# Patient Record
Sex: FEMALE | Race: WHITE | NOT HISPANIC OR LATINO | URBAN - METROPOLITAN AREA
[De-identification: names, ages, dates, MRNs, and addresses within clinical notes are randomized per-mention and may not be internally consistent; named-entity substitution may affect disease eponyms.]

---

## 2020-02-07 ENCOUNTER — OUTPATIENT (OUTPATIENT)
Dept: OUTPATIENT SERVICES | Facility: HOSPITAL | Age: 30
LOS: 1 days | Discharge: PSYCHIATRIC OR SUBSTANCE USE FACILITY | End: 2020-02-07
Payer: COMMERCIAL

## 2020-02-07 ENCOUNTER — INPATIENT (INPATIENT)
Facility: HOSPITAL | Age: 30
LOS: 6 days | Discharge: HOME | End: 2020-02-14
Attending: PSYCHIATRY & NEUROLOGY | Admitting: PSYCHIATRY & NEUROLOGY
Payer: COMMERCIAL

## 2020-02-07 VITALS — HEIGHT: 69 IN | WEIGHT: 169.98 LBS | RESPIRATION RATE: 19 BRPM | TEMPERATURE: 98 F

## 2020-02-07 DIAGNOSIS — F33.9 MAJOR DEPRESSIVE DISORDER, RECURRENT, UNSPECIFIED: ICD-10-CM

## 2020-02-07 PROCEDURE — 99221 1ST HOSP IP/OBS SF/LOW 40: CPT

## 2020-02-07 RX ORDER — CLONAZEPAM 1 MG
0.5 TABLET ORAL AT BEDTIME
Refills: 0 | Status: DISCONTINUED | OUTPATIENT
Start: 2020-02-07 | End: 2020-02-14

## 2020-02-07 RX ORDER — BREXPIPRAZOLE 0.25 MG/1
0.5 TABLET ORAL DAILY
Refills: 0 | Status: DISCONTINUED | OUTPATIENT
Start: 2020-02-08 | End: 2020-02-10

## 2020-02-07 RX ORDER — DESVENLAFAXINE 50 MG/1
50 TABLET, EXTENDED RELEASE ORAL DAILY
Refills: 0 | Status: DISCONTINUED | OUTPATIENT
Start: 2020-02-08 | End: 2020-02-11

## 2020-02-07 RX ORDER — TRAZODONE HCL 50 MG
50 TABLET ORAL AT BEDTIME
Refills: 0 | Status: DISCONTINUED | OUTPATIENT
Start: 2020-02-07 | End: 2020-02-08

## 2020-02-07 RX ADMIN — Medication 0.5 MILLIGRAM(S): at 21:09

## 2020-02-07 RX ADMIN — Medication 50 MILLIGRAM(S): at 21:09

## 2020-02-07 NOTE — CHART NOTE - NSCHARTNOTEFT_GEN_A_CORE
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    FAMILY HISTORY:  Family history of depression (mother)  Family history of anxiety (maternal aunts)  Family history of schizophrenia (cousins, uncles)      MEDICATIONS  (STANDING):  clonazePAM  Tablet 0.5 milliGRAM(s) Oral at bedtime    MEDICATIONS  (PRN):  LORazepam     Tablet 1 milliGRAM(s) Oral every 4 hours PRN anxiety/agitation  LORazepam   Injectable 2 milliGRAM(s) IntraMuscular once PRN severe agitation  traZODone 50 milliGRAM(s) Oral at bedtime PRN insomnia      Vital Signs Last 24 Hrs  T(C): 36.4 (07 Feb 2020 21:45), Max: 36.6 (07 Feb 2020 17:58)  T(F): 97.6 (07 Feb 2020 21:45), Max: 97.8 (07 Feb 2020 17:58)  HR: 75  BP: 109/76  RR: 18 (07 Feb 2020 21:45) (18 - 19)  SpO2: --  CAPILLARY BLOOD GLUCOSE      PHYSICAL EXAM:  GENERAL: NAD, well-developed, well-nourished, walking in day room upon arrival  HEAD:  Atraumatic, Normocephalic  EYES: EOMI, PERRLA, conjunctiva and sclera clear  NECK: Supple  CHEST/LUNG: Clear to auscultation bilaterally; No wheeze, crackles, rhonchi  HEART: Regular rate and rhythm; +s1 and +s2, No murmurs, rubs, or gallops  ABDOMEN: Soft, Nontender, Nondistended; normoactive Bowel sounds present  EXTREMITIES:  2+ Peripheral Pulses, No clubbing, or edema  PSYCH: AAOx3, pleasant, cooperative  NEUROLOGY: non-focal  SKIN: No rashes or lesions      LABS: *** TO BE DRAWN ON 2/8/2020***      RADIOLOGY & ADDITIONAL TESTS:    Assessment and Plan:    Pt is a 29y.o female who recently gave birth to her first child (12/25/19-boy) with PMHx significant for Inflammatory Bowel Disease who presents to [facility] from [facility] for further evaluation and management of Recurrent major depressive disorder. She reports having natural birth which required her to have an episotomy which is healing well according to her OB/Gyn provider from her 6 week follow up appt. She endorses minimal vaginal discharge since delivering her child on 12/25/19, which her OB/Gyn is aware of- denies this to be bright red blood, or malodorous. Pt currently denies any medical concern including fever, chills, visual changes (double vision, scotomas, vision loss), HA, SOB, CP, Abdominal pain, n/v, constipation, diarrhea, urinary symptoms (dysuria, hematuria, incontinence, frequency/urgency) or changes in bowel movements. Physical Exam unremarkable.     EKG and labs ordered, please follow-up: EKG, CBC, CMP, HcG, TSH, UA, and ToxScreen.    #) Recurrent major depressive disorder: management as per psychiatry team

## 2020-02-08 LAB
ALBUMIN SERPL ELPH-MCNC: 4.7 G/DL — SIGNIFICANT CHANGE UP (ref 3.3–5)
ALP SERPL-CCNC: 67 U/L — SIGNIFICANT CHANGE UP (ref 40–120)
ALT FLD-CCNC: 14 U/L — SIGNIFICANT CHANGE UP (ref 4–33)
ANION GAP SERPL CALC-SCNC: 16 MMO/L — HIGH (ref 7–14)
APAP SERPL-MCNC: < 15 UG/ML — LOW (ref 15–25)
AST SERPL-CCNC: 13 U/L — SIGNIFICANT CHANGE UP (ref 4–32)
BASOPHILS # BLD AUTO: 0.05 K/UL — SIGNIFICANT CHANGE UP (ref 0–0.2)
BASOPHILS NFR BLD AUTO: 0.8 % — SIGNIFICANT CHANGE UP (ref 0–2)
BILIRUB SERPL-MCNC: 0.7 MG/DL — SIGNIFICANT CHANGE UP (ref 0.2–1.2)
BUN SERPL-MCNC: 10 MG/DL — SIGNIFICANT CHANGE UP (ref 7–23)
CALCIUM SERPL-MCNC: 10 MG/DL — SIGNIFICANT CHANGE UP (ref 8.4–10.5)
CHLORIDE SERPL-SCNC: 102 MMOL/L — SIGNIFICANT CHANGE UP (ref 98–107)
CHOLEST SERPL-MCNC: 201 MG/DL — HIGH (ref 120–199)
CO2 SERPL-SCNC: 26 MMOL/L — SIGNIFICANT CHANGE UP (ref 22–31)
CREAT SERPL-MCNC: 1.04 MG/DL — SIGNIFICANT CHANGE UP (ref 0.5–1.3)
EOSINOPHIL # BLD AUTO: 0.14 K/UL — SIGNIFICANT CHANGE UP (ref 0–0.5)
EOSINOPHIL NFR BLD AUTO: 2.2 % — SIGNIFICANT CHANGE UP (ref 0–6)
ETHANOL BLD-MCNC: < 10 MG/DL — SIGNIFICANT CHANGE UP
GLUCOSE SERPL-MCNC: 100 MG/DL — HIGH (ref 70–99)
HBA1C BLD-MCNC: 5.2 % — SIGNIFICANT CHANGE UP (ref 4–5.6)
HCT VFR BLD CALC: 50.6 % — HIGH (ref 34.5–45)
HDLC SERPL-MCNC: 58 MG/DL — SIGNIFICANT CHANGE UP (ref 45–65)
HGB BLD-MCNC: 16.9 G/DL — HIGH (ref 11.5–15.5)
IMM GRANULOCYTES NFR BLD AUTO: 0.2 % — SIGNIFICANT CHANGE UP (ref 0–1.5)
LIPID PNL WITH DIRECT LDL SERPL: 130 MG/DL — SIGNIFICANT CHANGE UP
LYMPHOCYTES # BLD AUTO: 1.94 K/UL — SIGNIFICANT CHANGE UP (ref 1–3.3)
LYMPHOCYTES # BLD AUTO: 30.3 % — SIGNIFICANT CHANGE UP (ref 13–44)
MCHC RBC-ENTMCNC: 31.9 PG — SIGNIFICANT CHANGE UP (ref 27–34)
MCHC RBC-ENTMCNC: 33.4 % — SIGNIFICANT CHANGE UP (ref 32–36)
MCV RBC AUTO: 95.7 FL — SIGNIFICANT CHANGE UP (ref 80–100)
MONOCYTES # BLD AUTO: 0.45 K/UL — SIGNIFICANT CHANGE UP (ref 0–0.9)
MONOCYTES NFR BLD AUTO: 7 % — SIGNIFICANT CHANGE UP (ref 2–14)
NEUTROPHILS # BLD AUTO: 3.82 K/UL — SIGNIFICANT CHANGE UP (ref 1.8–7.4)
NEUTROPHILS NFR BLD AUTO: 59.5 % — SIGNIFICANT CHANGE UP (ref 43–77)
NRBC # FLD: 0 K/UL — SIGNIFICANT CHANGE UP (ref 0–0)
PLATELET # BLD AUTO: 222 K/UL — SIGNIFICANT CHANGE UP (ref 150–400)
PMV BLD: 11.1 FL — SIGNIFICANT CHANGE UP (ref 7–13)
POTASSIUM SERPL-MCNC: 3.8 MMOL/L — SIGNIFICANT CHANGE UP (ref 3.5–5.3)
POTASSIUM SERPL-SCNC: 3.8 MMOL/L — SIGNIFICANT CHANGE UP (ref 3.5–5.3)
PROT SERPL-MCNC: 7.4 G/DL — SIGNIFICANT CHANGE UP (ref 6–8.3)
RBC # BLD: 5.29 M/UL — HIGH (ref 3.8–5.2)
RBC # FLD: 11.9 % — SIGNIFICANT CHANGE UP (ref 10.3–14.5)
SALICYLATES SERPL-MCNC: < 5 MG/DL — LOW (ref 15–30)
SODIUM SERPL-SCNC: 144 MMOL/L — SIGNIFICANT CHANGE UP (ref 135–145)
TRIGL SERPL-MCNC: 94 MG/DL — SIGNIFICANT CHANGE UP (ref 10–149)
TSH SERPL-MCNC: 1.34 UIU/ML — SIGNIFICANT CHANGE UP (ref 0.27–4.2)
WBC # BLD: 6.41 K/UL — SIGNIFICANT CHANGE UP (ref 3.8–10.5)
WBC # FLD AUTO: 6.41 K/UL — SIGNIFICANT CHANGE UP (ref 3.8–10.5)

## 2020-02-08 PROCEDURE — 99222 1ST HOSP IP/OBS MODERATE 55: CPT

## 2020-02-08 RX ORDER — TRAZODONE HCL 50 MG
100 TABLET ORAL AT BEDTIME
Refills: 0 | Status: DISCONTINUED | OUTPATIENT
Start: 2020-02-08 | End: 2020-02-10

## 2020-02-08 RX ADMIN — Medication 0.5 MILLIGRAM(S): at 20:07

## 2020-02-08 RX ADMIN — Medication 100 MILLIGRAM(S): at 20:08

## 2020-02-08 RX ADMIN — BREXPIPRAZOLE 0.5 MILLIGRAM(S): 0.25 TABLET ORAL at 09:45

## 2020-02-08 RX ADMIN — DESVENLAFAXINE 50 MILLIGRAM(S): 50 TABLET, EXTENDED RELEASE ORAL at 09:45

## 2020-02-09 PROCEDURE — 99231 SBSQ HOSP IP/OBS SF/LOW 25: CPT

## 2020-02-09 PROCEDURE — 93010 ELECTROCARDIOGRAM REPORT: CPT

## 2020-02-09 RX ADMIN — BREXPIPRAZOLE 0.5 MILLIGRAM(S): 0.25 TABLET ORAL at 08:40

## 2020-02-09 RX ADMIN — Medication 100 MILLIGRAM(S): at 20:03

## 2020-02-09 RX ADMIN — DESVENLAFAXINE 50 MILLIGRAM(S): 50 TABLET, EXTENDED RELEASE ORAL at 08:40

## 2020-02-09 RX ADMIN — Medication 0.5 MILLIGRAM(S): at 20:03

## 2020-02-10 PROCEDURE — 99232 SBSQ HOSP IP/OBS MODERATE 35: CPT | Mod: GC

## 2020-02-10 PROCEDURE — 90853 GROUP PSYCHOTHERAPY: CPT

## 2020-02-10 RX ORDER — MIRTAZAPINE 45 MG/1
15 TABLET, ORALLY DISINTEGRATING ORAL AT BEDTIME
Refills: 0 | Status: DISCONTINUED | OUTPATIENT
Start: 2020-02-10 | End: 2020-02-14

## 2020-02-10 RX ADMIN — DESVENLAFAXINE 50 MILLIGRAM(S): 50 TABLET, EXTENDED RELEASE ORAL at 08:28

## 2020-02-10 RX ADMIN — Medication 0.5 MILLIGRAM(S): at 21:24

## 2020-02-10 RX ADMIN — BREXPIPRAZOLE 0.5 MILLIGRAM(S): 0.25 TABLET ORAL at 08:28

## 2020-02-10 RX ADMIN — MIRTAZAPINE 7.5 MILLIGRAM(S): 45 TABLET, ORALLY DISINTEGRATING ORAL at 21:24

## 2020-02-11 PROCEDURE — 90853 GROUP PSYCHOTHERAPY: CPT

## 2020-02-11 PROCEDURE — 99232 SBSQ HOSP IP/OBS MODERATE 35: CPT | Mod: GC

## 2020-02-11 RX ADMIN — Medication 0.5 MILLIGRAM(S): at 20:57

## 2020-02-11 RX ADMIN — MIRTAZAPINE 7.5 MILLIGRAM(S): 45 TABLET, ORALLY DISINTEGRATING ORAL at 20:57

## 2020-02-12 PROCEDURE — 99232 SBSQ HOSP IP/OBS MODERATE 35: CPT | Mod: GC

## 2020-02-12 RX ORDER — HYDROXYZINE HCL 10 MG
25 TABLET ORAL EVERY 6 HOURS
Refills: 0 | Status: DISCONTINUED | OUTPATIENT
Start: 2020-02-12 | End: 2020-02-14

## 2020-02-12 RX ORDER — ACETAMINOPHEN 500 MG
650 TABLET ORAL EVERY 6 HOURS
Refills: 0 | Status: DISCONTINUED | OUTPATIENT
Start: 2020-02-12 | End: 2020-02-14

## 2020-02-12 RX ADMIN — MIRTAZAPINE 15 MILLIGRAM(S): 45 TABLET, ORALLY DISINTEGRATING ORAL at 22:11

## 2020-02-12 RX ADMIN — Medication 0.5 MILLIGRAM(S): at 22:10

## 2020-02-13 PROCEDURE — 99232 SBSQ HOSP IP/OBS MODERATE 35: CPT | Mod: GC

## 2020-02-13 PROCEDURE — 90853 GROUP PSYCHOTHERAPY: CPT

## 2020-02-13 RX ORDER — CLONAZEPAM 1 MG
1 TABLET ORAL
Qty: 14 | Refills: 0
Start: 2020-02-13 | End: 2020-02-26

## 2020-02-13 RX ORDER — MIRTAZAPINE 45 MG/1
1 TABLET, ORALLY DISINTEGRATING ORAL
Qty: 14 | Refills: 0
Start: 2020-02-13 | End: 2020-02-26

## 2020-02-13 RX ADMIN — MIRTAZAPINE 15 MILLIGRAM(S): 45 TABLET, ORALLY DISINTEGRATING ORAL at 20:33

## 2020-02-13 RX ADMIN — Medication 0.5 MILLIGRAM(S): at 20:33

## 2020-02-14 VITALS — TEMPERATURE: 98 F | RESPIRATION RATE: 17 BRPM

## 2020-02-14 PROCEDURE — 99238 HOSP IP/OBS DSCHRG MGMT 30/<: CPT | Mod: GC

## 2020-02-18 ENCOUNTER — OUTPATIENT (OUTPATIENT)
Dept: OUTPATIENT SERVICES | Facility: HOSPITAL | Age: 30
LOS: 1 days | Discharge: HOME | End: 2020-02-18
Payer: COMMERCIAL

## 2020-02-19 DIAGNOSIS — F34.1 DYSTHYMIC DISORDER: ICD-10-CM

## 2021-01-13 PROCEDURE — 90853 GROUP PSYCHOTHERAPY: CPT | Mod: 95

## 2023-11-27 ENCOUNTER — OFFICE (OUTPATIENT)
Dept: URBAN - METROPOLITAN AREA CLINIC 35 | Facility: CLINIC | Age: 33
Setting detail: OPHTHALMOLOGY
End: 2023-11-27
Payer: COMMERCIAL

## 2023-11-27 DIAGNOSIS — H10.9: ICD-10-CM

## 2023-11-27 PROCEDURE — 92002 INTRM OPH EXAM NEW PATIENT: CPT | Performed by: OPHTHALMOLOGY

## 2023-11-27 ASSESSMENT — REFRACTION_CURRENTRX
OS_OVR_VA: 20/
OD_CYLINDER: +0.25
OD_SPHERE: +0.25
OS_VPRISM_DIRECTION: SV
OD_OVR_VA: 20/
OD_AXIS: 090
OS_AXIS: 146
OS_SPHERE: -0.25
OD_VPRISM_DIRECTION: SV
OS_CYLINDER: +0.25

## 2023-11-27 ASSESSMENT — REFRACTION_AUTOREFRACTION
OD_SPHERE: +0.25
OS_AXIS: 100
OD_CYLINDER: SPH
OS_SPHERE: PLANO
OS_CYLINDER: +0.25

## 2023-11-27 ASSESSMENT — CONFRONTATIONAL VISUAL FIELD TEST (CVF)
OS_FINDINGS: FULL
OD_FINDINGS: FULL

## 2023-12-13 ENCOUNTER — OFFICE (OUTPATIENT)
Dept: URBAN - METROPOLITAN AREA CLINIC 63 | Facility: CLINIC | Age: 33
Setting detail: OPHTHALMOLOGY
End: 2023-12-13
Payer: COMMERCIAL

## 2023-12-13 DIAGNOSIS — H10.9: ICD-10-CM

## 2023-12-13 PROCEDURE — 92012 INTRM OPH EXAM EST PATIENT: CPT | Performed by: STUDENT IN AN ORGANIZED HEALTH CARE EDUCATION/TRAINING PROGRAM

## 2023-12-13 ASSESSMENT — REFRACTION_AUTOREFRACTION
OD_CYLINDER: SPH
OS_SPHERE: PLANO
OS_AXIS: 100
OD_SPHERE: +0.25
OS_CYLINDER: +0.25

## 2023-12-13 ASSESSMENT — REFRACTION_CURRENTRX
OS_AXIS: 146
OD_SPHERE: +0.25
OD_OVR_VA: 20/
OD_AXIS: 090
OS_CYLINDER: +0.25
OD_VPRISM_DIRECTION: SV
OD_CYLINDER: +0.25
OS_OVR_VA: 20/
OS_VPRISM_DIRECTION: SV
OS_SPHERE: -0.25

## 2023-12-13 ASSESSMENT — CONFRONTATIONAL VISUAL FIELD TEST (CVF)
OD_FINDINGS: FULL
OS_FINDINGS: FULL

## 2025-03-10 ENCOUNTER — APPOINTMENT (OUTPATIENT)
Age: 35
End: 2025-03-10
Payer: SELF-PAY

## 2025-03-10 DIAGNOSIS — Z23 ENCOUNTER FOR IMMUNIZATION: ICD-10-CM

## 2025-03-10 DIAGNOSIS — Z29.14 ENCOUNTER FOR PROPHYLACTIC RABIES IMMUNE GLOBIN: ICD-10-CM

## 2025-03-10 PROBLEM — Z00.00 ENCOUNTER FOR PREVENTIVE HEALTH EXAMINATION: Status: ACTIVE | Noted: 2025-03-10

## 2025-03-17 ENCOUNTER — APPOINTMENT (OUTPATIENT)
Age: 35
End: 2025-03-17
Payer: SELF-PAY

## 2025-03-17 ENCOUNTER — TRANSCRIPTION ENCOUNTER (OUTPATIENT)
Age: 35
End: 2025-03-17

## 2025-03-17 DIAGNOSIS — Z23 ENCOUNTER FOR IMMUNIZATION: ICD-10-CM

## 2025-03-17 PROBLEM — Z00.00 ENCOUNTER FOR PREVENTIVE HEALTH EXAMINATION: Status: ACTIVE | Noted: 2025-03-17

## 2025-03-17 PROCEDURE — 90675 RABIES VACCINE IM: CPT

## 2025-03-18 PROBLEM — Z23 ENCOUNTER FOR IMMUNIZATION: Status: ACTIVE | Noted: 2025-03-18 | Resolved: 2025-04-01

## 2025-03-19 ENCOUNTER — APPOINTMENT (OUTPATIENT)
Dept: INFECTIOUS DISEASE | Facility: CLINIC | Age: 35
End: 2025-03-19